# Patient Record
Sex: MALE | URBAN - NONMETROPOLITAN AREA
[De-identification: names, ages, dates, MRNs, and addresses within clinical notes are randomized per-mention and may not be internally consistent; named-entity substitution may affect disease eponyms.]

---

## 2021-08-26 ENCOUNTER — APPOINTMENT (RX ONLY)
Dept: URBAN - NONMETROPOLITAN AREA CLINIC 13 | Facility: CLINIC | Age: 51
Setting detail: DERMATOLOGY
End: 2021-08-26

## 2021-08-26 DIAGNOSIS — L73.8 OTHER SPECIFIED FOLLICULAR DISORDERS: ICD-10-CM | Status: STABLE

## 2021-08-26 DIAGNOSIS — L81.4 OTHER MELANIN HYPERPIGMENTATION: ICD-10-CM | Status: STABLE

## 2021-08-26 DIAGNOSIS — L82.1 OTHER SEBORRHEIC KERATOSIS: ICD-10-CM

## 2021-08-26 DIAGNOSIS — L72.0 EPIDERMAL CYST: ICD-10-CM | Status: STABLE

## 2021-08-26 DIAGNOSIS — D22 MELANOCYTIC NEVI: ICD-10-CM | Status: STABLE

## 2021-08-26 PROBLEM — D22.62 MELANOCYTIC NEVI OF LEFT UPPER LIMB, INCLUDING SHOULDER: Status: ACTIVE | Noted: 2021-08-26

## 2021-08-26 PROBLEM — D23.71 OTHER BENIGN NEOPLASM OF SKIN OF RIGHT LOWER LIMB, INCLUDING HIP: Status: ACTIVE | Noted: 2021-08-26

## 2021-08-26 PROBLEM — D22.5 MELANOCYTIC NEVI OF TRUNK: Status: ACTIVE | Noted: 2021-08-26

## 2021-08-26 PROBLEM — D22.61 MELANOCYTIC NEVI OF RIGHT UPPER LIMB, INCLUDING SHOULDER: Status: ACTIVE | Noted: 2021-08-26

## 2021-08-26 PROCEDURE — ? ADDITIONAL NOTES

## 2021-08-26 PROCEDURE — ? COUNSELING

## 2021-08-26 PROCEDURE — ? SUNSCREEN RECOMMENDATIONS

## 2021-08-26 PROCEDURE — 99203 OFFICE O/P NEW LOW 30 MIN: CPT

## 2021-08-26 ASSESSMENT — LOCATION SIMPLE DESCRIPTION DERM
LOCATION SIMPLE: RIGHT FOREARM
LOCATION SIMPLE: RIGHT UPPER BACK
LOCATION SIMPLE: UPPER BACK
LOCATION SIMPLE: CHEST
LOCATION SIMPLE: LEFT EYEBROW
LOCATION SIMPLE: RIGHT UPPER ARM
LOCATION SIMPLE: LEFT FOREARM
LOCATION SIMPLE: LEFT UPPER ARM

## 2021-08-26 ASSESSMENT — LOCATION DETAILED DESCRIPTION DERM
LOCATION DETAILED: LEFT CENTRAL EYEBROW
LOCATION DETAILED: LEFT PROXIMAL DORSAL FOREARM
LOCATION DETAILED: RIGHT MEDIAL UPPER BACK
LOCATION DETAILED: RIGHT DISTAL POSTERIOR UPPER ARM
LOCATION DETAILED: LEFT DISTAL POSTERIOR UPPER ARM
LOCATION DETAILED: STERNUM
LOCATION DETAILED: SUPERIOR THORACIC SPINE
LOCATION DETAILED: RIGHT PROXIMAL DORSAL FOREARM
LOCATION DETAILED: RIGHT SUPERIOR LATERAL UPPER BACK

## 2021-08-26 ASSESSMENT — LOCATION ZONE DERM
LOCATION ZONE: TRUNK
LOCATION ZONE: ARM
LOCATION ZONE: FACE

## 2021-08-26 NOTE — PROCEDURE: ADDITIONAL NOTES
Detail Level: Simple
Render Risk Assessment In Note?: no
Additional Notes: Due to recurrent abscesses, patient desires excision.
Additional Notes: Discussed use of LN2 in future if lesion becomes symptomatic.

## 2023-03-07 ENCOUNTER — APPOINTMENT (RX ONLY)
Dept: URBAN - NONMETROPOLITAN AREA CLINIC 13 | Facility: CLINIC | Age: 53
Setting detail: DERMATOLOGY
End: 2023-03-07

## 2023-03-07 DIAGNOSIS — L82.1 OTHER SEBORRHEIC KERATOSIS: ICD-10-CM | Status: STABLE

## 2023-03-07 DIAGNOSIS — L72.8 OTHER FOLLICULAR CYSTS OF THE SKIN AND SUBCUTANEOUS TISSUE: ICD-10-CM | Status: STABLE

## 2023-03-07 DIAGNOSIS — L82.0 INFLAMED SEBORRHEIC KERATOSIS: ICD-10-CM

## 2023-03-07 DIAGNOSIS — L81.4 OTHER MELANIN HYPERPIGMENTATION: ICD-10-CM

## 2023-03-07 DIAGNOSIS — D22 MELANOCYTIC NEVI: ICD-10-CM | Status: STABLE

## 2023-03-07 PROBLEM — D23.71 OTHER BENIGN NEOPLASM OF SKIN OF RIGHT LOWER LIMB, INCLUDING HIP: Status: ACTIVE | Noted: 2023-03-07

## 2023-03-07 PROBLEM — D48.5 NEOPLASM OF UNCERTAIN BEHAVIOR OF SKIN: Status: ACTIVE | Noted: 2023-03-07

## 2023-03-07 PROBLEM — D23.5 OTHER BENIGN NEOPLASM OF SKIN OF TRUNK: Status: ACTIVE | Noted: 2023-03-07

## 2023-03-07 PROBLEM — D22.5 MELANOCYTIC NEVI OF TRUNK: Status: ACTIVE | Noted: 2023-03-07

## 2023-03-07 PROCEDURE — 99213 OFFICE O/P EST LOW 20 MIN: CPT | Mod: 25

## 2023-03-07 PROCEDURE — 11401 EXC TR-EXT B9+MARG 0.6-1 CM: CPT | Mod: 59

## 2023-03-07 PROCEDURE — ? LIQUID NITROGEN

## 2023-03-07 PROCEDURE — ? COUNSELING

## 2023-03-07 PROCEDURE — ? PUNCH EXCISION

## 2023-03-07 PROCEDURE — ? SUNSCREEN RECOMMENDATIONS

## 2023-03-07 PROCEDURE — 17110 DESTRUCTION B9 LES UP TO 14: CPT

## 2023-03-07 ASSESSMENT — LOCATION ZONE DERM
LOCATION ZONE: ARM
LOCATION ZONE: FACE
LOCATION ZONE: TRUNK

## 2023-03-07 ASSESSMENT — LOCATION DETAILED DESCRIPTION DERM
LOCATION DETAILED: EPIGASTRIC SKIN
LOCATION DETAILED: LEFT MEDIAL SUPERIOR CHEST
LOCATION DETAILED: STERNUM
LOCATION DETAILED: RIGHT LATERAL EYEBROW
LOCATION DETAILED: RIGHT SUPERIOR UPPER BACK
LOCATION DETAILED: RIGHT DISTAL POSTERIOR UPPER ARM
LOCATION DETAILED: LEFT DISTAL POSTERIOR UPPER ARM
LOCATION DETAILED: SUPERIOR THORACIC SPINE
LOCATION DETAILED: LEFT CENTRAL EYEBROW
LOCATION DETAILED: RIGHT MEDIAL UPPER BACK

## 2023-03-07 ASSESSMENT — LOCATION SIMPLE DESCRIPTION DERM
LOCATION SIMPLE: UPPER BACK
LOCATION SIMPLE: ABDOMEN
LOCATION SIMPLE: LEFT EYEBROW
LOCATION SIMPLE: RIGHT UPPER ARM
LOCATION SIMPLE: LEFT UPPER ARM
LOCATION SIMPLE: RIGHT UPPER BACK
LOCATION SIMPLE: CHEST
LOCATION SIMPLE: RIGHT EYEBROW

## 2023-03-07 NOTE — PROCEDURE: PUNCH EXCISION
Medical Necessity Clause: This procedure was medically necessary because the lesion that was treated was:
Detail Level: Detailed
Size Of Lesion (*Required): 0.8
X Size Of Lesion Width In Cm (Optional): 0
Size Of Margin In Cm: 0.1
Punch Size In Mm: 10
Repair Type: None (Simple)
Complex Requirements: Extensive Undermining Performed?: No
Undermining Type: Entire Wound
Debridement Text: The wound edges were debrided prior to proceeding with the closure to facilitate wound healing.
Helical Rim Text: The closure involved the helical rim.
Vermilion Border Text: The closure involved the vermilion border.
Nostril Rim Text: The closure involved the nostril rim.
Retention Suture Text: Retention sutures were placed to support the closure and prevent dehiscence.
Include Undermining Statement In The Repair Note?: Yes
Anesthesia Type: 1% lidocaine with epinephrine
Anesthesia Volume In Cc: 2.4
Hemostasis: Pressure
Deep Sutures: 5-0 Vicryl
Epidermal Sutures: 4-0 Ethilon
Number Of Epidermal Sutures (Optional): 3
Epidermal Closure: simple interrupted
Wound Care: Petrolatum
Wound Dressings: a bandage
Suture Removal: 14 days
Lab: [id number]
Lab Facility: [id number]
Path Notes (To The Dermatopathologist): Please check margins.
1.5 Mm Punch Excision Text: A 1.5 mm punch biopsy was used to excise the lesion to the level of the subcutaneous fat.  Blunt dissection was used to free the lesion from the surrounding tissues and the lesion was removed.
2 Mm Punch Excision Text: A 2 mm punch biopsy was used to excise the lesion to the level of the subcutaneous fat.  Blunt dissection was used to free the lesion from the surrounding tissues and the lesion was removed.
2.5 Mm Punch Excision Text: A 2.5 mm punch biopsy was used to excise the lesion to the level of the subcutaneous fat.  Blunt dissection was used to free the lesion from the surrounding tissues and the lesion was removed.
3 Mm Punch Excision Text: A 3 mm punch biopsy was used to excise the lesion to the level of the subcutaneous fat.  Blunt dissection was used to free the lesion from the surrounding tissues and the lesion was removed.
3.5 Mm Punch Excision Text: A 3.5 mm punch biopsy was used to excise the lesion to the level of the subcutaneous fat.  Blunt dissection was used to free the lesion from the surrounding tissues and the lesion was removed.
4 Mm Punch Excision Text: A 4 mm punch biopsy was used to excise the lesion to the level of the subcutaneous fat.  Blunt dissection was used to free the lesion from the surrounding tissues and the lesion was removed.
4.5 Mm Punch Excision Text: A 4.5 mm punch biopsy was used to excise the lesion to the level of the subcutaneous fat.  Blunt dissection was used to free the lesion from the surrounding tissues and the lesion was removed.
5 Mm Punch Excision Text: A 5 mm punch biopsy was used to excise the lesion to the level of the subcutaneous fat.  Blunt dissection was used to free the lesion from the surrounding tissues and the lesion was removed.
6 Mm Punch Excision Text: A 6 mm punch biopsy was used to excise the lesion to the level of the subcutaneous fat.  Blunt dissection was used to free the lesion from the surrounding tissues and the lesion was removed.
7 Mm Punch Excision Text: A 7 mm punch biopsy was used to excise the lesion to the level of the subcutaneous fat.  Blunt dissection was used to free the lesion from the surrounding tissues and the lesion was removed.
8 Mm Punch Excision Text: A 8 mm punch biopsy was used to excise the lesion to the level of the subcutaneous fat.  Blunt dissection was used to free the lesion from the surrounding tissues and the lesion was removed.
10 Mm Punch Excision Text: A 10 mm punch biopsy was used to excise the lesion to the level of the subcutaneous fat.  Blunt dissection was used to free the lesion from the surrounding tissues and the lesion was removed.
12 Mm Punch Excision Text: A 12 mm punch biopsy was used to excise the lesion to the level of the subcutaneous fat.  Blunt dissection was used to free the lesion from the surrounding tissues and the lesion was removed.
Consent was obtained from the patient. The risks and benefits to therapy were discussed in detail. Specifically, the risks of infection, scarring, bleeding, prolonged wound healing, incomplete removal, allergy to anesthesia, nerve injury and recurrence were addressed. Prior to the procedure, the treatment site was clearly identified and confirmed by the patient. All components of Universal Protocol/PAUSE Rule completed.
Post-Care Instructions: I reviewed with the patient in detail post-care instructions. Patient is to keep the biopsy site dry overnight, and then apply bacitracin twice daily until healed. Patient may apply hydrogen peroxide soaks to remove any crusting.
Notification Instructions: Patient will be notified of biopsy results. However, patient instructed to call the office if not contacted within 2 weeks.
Billing Type: Third-Party Bill

## 2023-03-07 NOTE — HPI: SKIN LESIONS
Is This A New Presentation, Or A Follow-Up?: Skin Lesion
How Severe Is Your Skin Lesion?: mild
Have Your Skin Lesions Been Treated?: not been treated
Additional History: Pt present for FBSE today.

## 2023-03-07 NOTE — PROCEDURE: LIQUID NITROGEN
Medical Necessity Clause: This procedure was medically necessary because the lesions that were treated were:
Add 52 Modifier (Optional): no
Show Applicator Variable?: Yes
Number Of Freeze-Thaw Cycles: 2 freeze-thaw cycles
Spray Paint Text: The liquid nitrogen was applied to the skin utilizing a spray paint frosting technique.
Post-Care Instructions: I reviewed with the patient in detail post-care instructions. Patient is to wear sunprotection, and avoid picking at any of the treated lesions. Pt may apply Vaseline to crusted or scabbing areas.
Detail Level: Detailed
Consent: The patient's consent was obtained including but not limited to risks of crusting, scabbing, blistering, scarring, darker or lighter pigmentary change, recurrence, incomplete removal and infection.
Medical Necessity Information: It is in your best interest to select a reason for this procedure from the list below. All of these items fulfill various CMS LCD requirements except the new and changing color options.